# Patient Record
Sex: FEMALE | Race: WHITE
[De-identification: names, ages, dates, MRNs, and addresses within clinical notes are randomized per-mention and may not be internally consistent; named-entity substitution may affect disease eponyms.]

---

## 2018-01-26 ENCOUNTER — HOSPITAL ENCOUNTER (OUTPATIENT)
Dept: HOSPITAL 94 - DIABETIC | Age: 63
Discharge: HOME | End: 2018-01-26
Attending: SURGERY
Payer: MEDICARE

## 2018-01-26 DIAGNOSIS — N18.9: ICD-10-CM

## 2018-01-26 DIAGNOSIS — I12.9: ICD-10-CM

## 2018-01-26 DIAGNOSIS — Z85.9: ICD-10-CM

## 2018-01-26 DIAGNOSIS — E66.01: Primary | ICD-10-CM

## 2018-01-26 DIAGNOSIS — E11.22: ICD-10-CM

## 2018-01-26 PROCEDURE — 97802 MEDICAL NUTRITION INDIV IN: CPT

## 2018-02-16 ENCOUNTER — HOSPITAL ENCOUNTER (OUTPATIENT)
Dept: HOSPITAL 94 - DIABETIC | Age: 63
Discharge: HOME | End: 2018-02-16
Attending: SURGERY
Payer: MEDICARE

## 2018-02-16 DIAGNOSIS — E11.9: ICD-10-CM

## 2018-02-16 DIAGNOSIS — E66.01: Primary | ICD-10-CM

## 2018-02-16 PROCEDURE — 97802 MEDICAL NUTRITION INDIV IN: CPT

## 2018-03-23 ENCOUNTER — HOSPITAL ENCOUNTER (OUTPATIENT)
Dept: HOSPITAL 94 - DIABETIC | Age: 63
Discharge: HOME | End: 2018-03-23
Attending: SURGERY
Payer: MEDICARE

## 2018-03-23 DIAGNOSIS — E66.01: Primary | ICD-10-CM

## 2018-03-23 DIAGNOSIS — E11.22: ICD-10-CM

## 2018-03-23 DIAGNOSIS — N18.9: ICD-10-CM

## 2018-03-23 DIAGNOSIS — I12.9: ICD-10-CM

## 2018-03-23 PROCEDURE — 97802 MEDICAL NUTRITION INDIV IN: CPT

## 2020-04-24 ENCOUNTER — HOSPITAL ENCOUNTER (EMERGENCY)
Dept: HOSPITAL 94 - ER | Age: 65
Discharge: HOME | End: 2020-04-24
Payer: MEDICARE

## 2020-04-24 VITALS — DIASTOLIC BLOOD PRESSURE: 93 MMHG | SYSTOLIC BLOOD PRESSURE: 159 MMHG

## 2020-04-24 VITALS — WEIGHT: 227.08 LBS | HEIGHT: 61 IN | BODY MASS INDEX: 42.87 KG/M2

## 2020-04-24 DIAGNOSIS — Z85.3: ICD-10-CM

## 2020-04-24 DIAGNOSIS — N18.4: ICD-10-CM

## 2020-04-24 DIAGNOSIS — E11.22: ICD-10-CM

## 2020-04-24 DIAGNOSIS — Z79.82: ICD-10-CM

## 2020-04-24 DIAGNOSIS — Z79.4: ICD-10-CM

## 2020-04-24 DIAGNOSIS — R00.0: ICD-10-CM

## 2020-04-24 DIAGNOSIS — L03.113: ICD-10-CM

## 2020-04-24 DIAGNOSIS — Z79.899: ICD-10-CM

## 2020-04-24 DIAGNOSIS — I89.0: Primary | ICD-10-CM

## 2020-04-24 DIAGNOSIS — E11.65: ICD-10-CM

## 2020-04-24 LAB
ALBUMIN SERPL BCP-MCNC: 3.3 G/DL (ref 3.4–5)
ALBUMIN/GLOB SERPL: 0.8 {RATIO} (ref 1.1–1.5)
ALP SERPL-CCNC: 152 IU/L (ref 46–116)
ALT SERPL W P-5'-P-CCNC: 21 U/L (ref 12–78)
ANION GAP SERPL CALCULATED.3IONS-SCNC: 11 MMOL/L (ref 8–16)
AST SERPL W P-5'-P-CCNC: 13 U/L (ref 10–37)
BASOPHILS # BLD AUTO: 0.1 X10'3 (ref 0–0.2)
BASOPHILS NFR BLD AUTO: 0.4 % (ref 0–1)
BILIRUB SERPL-MCNC: 0.2 MG/DL (ref 0.1–1)
BUN SERPL-MCNC: 35 MG/DL (ref 7–18)
BUN/CREAT SERPL: 12.7 (ref 6.6–38)
CALCIUM SERPL-MCNC: 9 MG/DL (ref 8.5–10.1)
CHLORIDE SERPL-SCNC: 110 MMOL/L (ref 99–107)
CO2 SERPL-SCNC: 20.6 MMOL/L (ref 24–32)
CREAT SERPL-MCNC: 2.75 MG/DL (ref 0.4–0.9)
EOSINOPHIL # BLD AUTO: 0.2 X10'3 (ref 0–0.9)
EOSINOPHIL NFR BLD AUTO: 1.1 % (ref 0–6)
ERYTHROCYTE [DISTWIDTH] IN BLOOD BY AUTOMATED COUNT: 14 % (ref 11.5–14.5)
GFR SERPL CREATININE-BSD FRML MDRD: 17 ML/MIN
GLUCOSE SERPL-MCNC: 254 MG/DL (ref 70–104)
HCT VFR BLD AUTO: 38.7 % (ref 35–45)
HGB BLD-MCNC: 12.5 G/DL (ref 12–16)
LYMPHOCYTES # BLD AUTO: 0.8 X10'3 (ref 1.1–4.8)
LYMPHOCYTES NFR BLD AUTO: 4.7 % (ref 21–51)
MCH RBC QN AUTO: 29.1 PG (ref 27–31)
MCHC RBC AUTO-ENTMCNC: 32.3 G/DL (ref 33–36.5)
MCV RBC AUTO: 90.3 FL (ref 78–98)
MONOCYTES # BLD AUTO: 0.6 X10'3 (ref 0–0.9)
MONOCYTES NFR BLD AUTO: 3.5 % (ref 2–12)
NEUTROPHILS # BLD AUTO: 14.9 X10'3 (ref 1.8–7.7)
NEUTROPHILS NFR BLD AUTO: 90.3 % (ref 42–75)
PLATELET # BLD AUTO: 250 X10'3 (ref 140–440)
PMV BLD AUTO: 7.4 FL (ref 7.4–10.4)
POTASSIUM SERPL-SCNC: 4.8 MMOL/L (ref 3.5–5.1)
PROT SERPL-MCNC: 7.4 G/DL (ref 6.4–8.2)
RBC # BLD AUTO: 4.28 X10'6 (ref 4.2–5.6)
SODIUM SERPL-SCNC: 142 MMOL/L (ref 135–145)
WBC # BLD AUTO: 16.5 X10'3 (ref 4.5–11)

## 2020-04-24 PROCEDURE — 84145 PROCALCITONIN (PCT): CPT

## 2020-04-24 PROCEDURE — 99284 EMERGENCY DEPT VISIT MOD MDM: CPT

## 2020-04-24 PROCEDURE — 93005 ELECTROCARDIOGRAM TRACING: CPT

## 2020-04-24 PROCEDURE — 80053 COMPREHEN METABOLIC PANEL: CPT

## 2020-04-24 PROCEDURE — 85025 COMPLETE CBC W/AUTO DIFF WBC: CPT

## 2020-04-24 PROCEDURE — 36415 COLL VENOUS BLD VENIPUNCTURE: CPT

## 2020-08-25 ENCOUNTER — HOSPITAL ENCOUNTER (EMERGENCY)
Dept: HOSPITAL 94 - ER | Age: 65
Discharge: HOME | End: 2020-08-25
Payer: MEDICARE

## 2020-08-25 VITALS — WEIGHT: 219.58 LBS | HEIGHT: 61 IN | BODY MASS INDEX: 41.46 KG/M2

## 2020-08-25 VITALS — DIASTOLIC BLOOD PRESSURE: 79 MMHG | SYSTOLIC BLOOD PRESSURE: 118 MMHG

## 2020-08-25 DIAGNOSIS — I80.01: Primary | ICD-10-CM

## 2020-08-25 DIAGNOSIS — N18.9: ICD-10-CM

## 2020-08-25 DIAGNOSIS — Z79.4: ICD-10-CM

## 2020-08-25 DIAGNOSIS — M79.661: ICD-10-CM

## 2020-08-25 DIAGNOSIS — E11.22: ICD-10-CM

## 2020-08-25 DIAGNOSIS — M79.89: ICD-10-CM

## 2020-08-25 DIAGNOSIS — Z79.82: ICD-10-CM

## 2020-08-25 DIAGNOSIS — Z85.3: ICD-10-CM

## 2020-08-25 DIAGNOSIS — Z79.899: ICD-10-CM

## 2020-08-25 PROCEDURE — 99284 EMERGENCY DEPT VISIT MOD MDM: CPT

## 2020-08-25 PROCEDURE — 93971 EXTREMITY STUDY: CPT

## 2021-09-17 ENCOUNTER — HOSPITAL ENCOUNTER (EMERGENCY)
Dept: HOSPITAL 94 - ER | Age: 66
Discharge: HOME | End: 2021-09-17
Payer: MEDICARE

## 2021-09-17 VITALS — SYSTOLIC BLOOD PRESSURE: 107 MMHG | DIASTOLIC BLOOD PRESSURE: 46 MMHG

## 2021-09-17 VITALS — BODY MASS INDEX: 45.33 KG/M2 | HEIGHT: 59 IN | WEIGHT: 224.87 LBS

## 2021-09-17 DIAGNOSIS — Z79.899: ICD-10-CM

## 2021-09-17 DIAGNOSIS — Z79.01: ICD-10-CM

## 2021-09-17 DIAGNOSIS — Z79.2: ICD-10-CM

## 2021-09-17 DIAGNOSIS — Z85.9: ICD-10-CM

## 2021-09-17 DIAGNOSIS — L03.113: ICD-10-CM

## 2021-09-17 DIAGNOSIS — N18.4: ICD-10-CM

## 2021-09-17 DIAGNOSIS — Z79.4: ICD-10-CM

## 2021-09-17 DIAGNOSIS — E11.22: ICD-10-CM

## 2021-09-17 DIAGNOSIS — I82.433: Primary | ICD-10-CM

## 2021-09-17 LAB
ALBUMIN SERPL BCP-MCNC: 3.3 G/DL (ref 3.4–5)
ALBUMIN/GLOB SERPL: 0.8 {RATIO} (ref 1.1–1.5)
ALP SERPL-CCNC: 155 IU/L (ref 46–116)
ALT SERPL W P-5'-P-CCNC: 17 U/L (ref 12–78)
ANION GAP SERPL CALCULATED.3IONS-SCNC: 13 MMOL/L (ref 8–16)
AST SERPL W P-5'-P-CCNC: 10 U/L (ref 10–37)
BASOPHILS # BLD AUTO: 0.1 X10'3 (ref 0–0.2)
BASOPHILS NFR BLD AUTO: 0.4 % (ref 0–1)
BILIRUB SERPL-MCNC: 0.4 MG/DL (ref 0.1–1)
BUN SERPL-MCNC: 36 MG/DL (ref 7–18)
BUN/CREAT SERPL: 13.2 (ref 6.6–38)
CALCIUM SERPL-MCNC: 8.7 MG/DL (ref 8.5–10.1)
CHLORIDE SERPL-SCNC: 111 MMOL/L (ref 99–107)
CO2 SERPL-SCNC: 18.3 MMOL/L (ref 24–32)
CREAT SERPL-MCNC: 2.73 MG/DL (ref 0.4–0.9)
EOSINOPHIL # BLD AUTO: 0.1 X10'3 (ref 0–0.9)
EOSINOPHIL NFR BLD AUTO: 0.8 % (ref 0–6)
ERYTHROCYTE [DISTWIDTH] IN BLOOD BY AUTOMATED COUNT: 14.6 % (ref 11.5–14.5)
GFR SERPL CREATININE-BSD FRML MDRD: 17 ML/MIN
GLUCOSE SERPL-MCNC: 164 MG/DL (ref 70–104)
HCT VFR BLD AUTO: 38.9 % (ref 35–45)
HGB BLD-MCNC: 12.3 G/DL (ref 12–16)
LYMPHOCYTES # BLD AUTO: 0.8 X10'3 (ref 1.1–4.8)
LYMPHOCYTES NFR BLD AUTO: 4.8 % (ref 21–51)
MCH RBC QN AUTO: 27.5 PG (ref 27–31)
MCHC RBC AUTO-ENTMCNC: 31.6 G/DL (ref 33–36.5)
MCV RBC AUTO: 86.9 FL (ref 78–98)
MONOCYTES # BLD AUTO: 0.4 X10'3 (ref 0–0.9)
MONOCYTES NFR BLD AUTO: 2.2 % (ref 2–12)
NEUTROPHILS # BLD AUTO: 16.3 X10'3 (ref 1.8–7.7)
NEUTROPHILS NFR BLD AUTO: 91.8 % (ref 42–75)
PLATELET # BLD AUTO: 121 X10'3 (ref 140–440)
PMV BLD AUTO: 8.3 FL (ref 7.4–10.4)
POTASSIUM SERPL-SCNC: 5.1 MMOL/L (ref 3.5–5.1)
PROT SERPL-MCNC: 7.4 G/DL (ref 6.4–8.2)
RBC # BLD AUTO: 4.47 X10'6 (ref 4.2–5.6)
SODIUM SERPL-SCNC: 142 MMOL/L (ref 135–145)
WBC # BLD AUTO: 17.7 X10'3 (ref 4.5–11)

## 2021-09-17 PROCEDURE — 84145 PROCALCITONIN (PCT): CPT

## 2021-09-17 PROCEDURE — 85025 COMPLETE CBC W/AUTO DIFF WBC: CPT

## 2021-09-17 PROCEDURE — 96361 HYDRATE IV INFUSION ADD-ON: CPT

## 2021-09-17 PROCEDURE — 80053 COMPREHEN METABOLIC PANEL: CPT

## 2021-09-17 PROCEDURE — 99285 EMERGENCY DEPT VISIT HI MDM: CPT

## 2021-09-17 PROCEDURE — 83605 ASSAY OF LACTIC ACID: CPT

## 2021-09-17 PROCEDURE — 96360 HYDRATION IV INFUSION INIT: CPT

## 2021-09-17 PROCEDURE — 87040 BLOOD CULTURE FOR BACTERIA: CPT

## 2021-09-17 PROCEDURE — 93970 EXTREMITY STUDY: CPT

## 2021-09-17 PROCEDURE — 36415 COLL VENOUS BLD VENIPUNCTURE: CPT

## 2021-09-17 PROCEDURE — 96372 THER/PROPH/DIAG INJ SC/IM: CPT

## 2021-09-17 PROCEDURE — 93005 ELECTROCARDIOGRAM TRACING: CPT

## 2022-06-21 ENCOUNTER — HOSPITAL ENCOUNTER (INPATIENT)
Dept: HOSPITAL 94 - ER | Age: 67
LOS: 3 days | Discharge: HOME | DRG: 682 | End: 2022-06-24
Attending: FAMILY MEDICINE | Admitting: FAMILY MEDICINE
Payer: MEDICARE

## 2022-06-21 VITALS — HEIGHT: 59 IN | BODY MASS INDEX: 48.09 KG/M2 | WEIGHT: 238.54 LBS

## 2022-06-21 VITALS — SYSTOLIC BLOOD PRESSURE: 171 MMHG | DIASTOLIC BLOOD PRESSURE: 90 MMHG

## 2022-06-21 DIAGNOSIS — Z90.11: ICD-10-CM

## 2022-06-21 DIAGNOSIS — E87.6: ICD-10-CM

## 2022-06-21 DIAGNOSIS — E87.0: ICD-10-CM

## 2022-06-21 DIAGNOSIS — Z86.718: ICD-10-CM

## 2022-06-21 DIAGNOSIS — Z20.822: ICD-10-CM

## 2022-06-21 DIAGNOSIS — Z85.3: ICD-10-CM

## 2022-06-21 DIAGNOSIS — Z80.1: ICD-10-CM

## 2022-06-21 DIAGNOSIS — E11.22: ICD-10-CM

## 2022-06-21 DIAGNOSIS — D72.829: ICD-10-CM

## 2022-06-21 DIAGNOSIS — Z79.4: ICD-10-CM

## 2022-06-21 DIAGNOSIS — E78.5: ICD-10-CM

## 2022-06-21 DIAGNOSIS — I13.0: ICD-10-CM

## 2022-06-21 DIAGNOSIS — Z79.899: ICD-10-CM

## 2022-06-21 DIAGNOSIS — Z79.82: ICD-10-CM

## 2022-06-21 DIAGNOSIS — E87.2: ICD-10-CM

## 2022-06-21 DIAGNOSIS — I16.9: ICD-10-CM

## 2022-06-21 DIAGNOSIS — I50.9: ICD-10-CM

## 2022-06-21 DIAGNOSIS — Z82.49: ICD-10-CM

## 2022-06-21 DIAGNOSIS — E83.42: ICD-10-CM

## 2022-06-21 DIAGNOSIS — E66.01: ICD-10-CM

## 2022-06-21 DIAGNOSIS — F41.0: ICD-10-CM

## 2022-06-21 DIAGNOSIS — K29.70: ICD-10-CM

## 2022-06-21 DIAGNOSIS — Z79.01: ICD-10-CM

## 2022-06-21 DIAGNOSIS — G93.41: ICD-10-CM

## 2022-06-21 DIAGNOSIS — E43: ICD-10-CM

## 2022-06-21 DIAGNOSIS — N17.0: Primary | ICD-10-CM

## 2022-06-21 DIAGNOSIS — E87.5: ICD-10-CM

## 2022-06-21 DIAGNOSIS — N18.4: ICD-10-CM

## 2022-06-21 DIAGNOSIS — Z91.14: ICD-10-CM

## 2022-06-21 LAB
ALBUMIN SERPL BCP-MCNC: 2.9 G/DL (ref 3.4–5)
ALBUMIN/GLOB SERPL: 0.8 {RATIO} (ref 1.1–1.5)
ALP SERPL-CCNC: 96 IU/L (ref 46–116)
ALT SERPL W P-5'-P-CCNC: 15 U/L (ref 12–78)
ANION GAP SERPL CALCULATED.3IONS-SCNC: 16 MMOL/L (ref 8–16)
APTT PPP: 25 SECONDS (ref 22–32)
AST SERPL W P-5'-P-CCNC: 9 U/L (ref 10–37)
BASOPHILS # BLD AUTO: 0.1 X10'3 (ref 0–0.2)
BASOPHILS NFR BLD AUTO: 1 % (ref 0–1)
BILIRUB SERPL-MCNC: 0.3 MG/DL (ref 0.1–1)
BUN SERPL-MCNC: 51 MG/DL (ref 7–18)
BUN/CREAT SERPL: 9.7 (ref 6.6–38)
CALCIUM SERPL-MCNC: 7.8 MG/DL (ref 8.5–10.1)
CHLORIDE SERPL-SCNC: 111 MMOL/L (ref 99–107)
CO2 SERPL-SCNC: 17.3 MMOL/L (ref 24–32)
CREAT SERPL-MCNC: 5.26 MG/DL (ref 0.4–0.9)
EOSINOPHIL # BLD AUTO: 0.2 X10'3 (ref 0–0.9)
EOSINOPHIL NFR BLD AUTO: 1.9 % (ref 0–6)
ERYTHROCYTE [DISTWIDTH] IN BLOOD BY AUTOMATED COUNT: 14.2 % (ref 11.5–14.5)
GFR SERPL CREATININE-BSD FRML MDRD: 8 ML/MIN
GLUCOSE SERPL-MCNC: 152 MG/DL (ref 70–104)
HBA1C MFR BLD: 6.8 % (ref 4.5–6.2)
HCT VFR BLD AUTO: 36.9 % (ref 35–45)
HGB BLD-MCNC: 12.2 G/DL (ref 12–16)
LYMPHOCYTES # BLD AUTO: 1.4 X10'3 (ref 1.1–4.8)
LYMPHOCYTES NFR BLD AUTO: 14.8 % (ref 21–51)
MAGNESIUM SERPL-MCNC: 1.3 MG/DL (ref 1.5–2.4)
MCH RBC QN AUTO: 29.3 PG (ref 27–31)
MCHC RBC AUTO-ENTMCNC: 33.1 G/DL (ref 33–36.5)
MCV RBC AUTO: 88.6 FL (ref 78–98)
MONOCYTES # BLD AUTO: 0.5 X10'3 (ref 0–0.9)
MONOCYTES NFR BLD AUTO: 5.6 % (ref 2–12)
NEUTROPHILS # BLD AUTO: 7.3 X10'3 (ref 1.8–7.7)
NEUTROPHILS NFR BLD AUTO: 76.7 % (ref 42–75)
PHOSPHATE SERPL-MCNC: 5.9 MG/DL (ref 2.3–4.5)
PLATELET # BLD AUTO: 245 X10'3 (ref 140–440)
PMV BLD AUTO: 7.7 FL (ref 7.4–10.4)
POTASSIUM SERPL-SCNC: 5.1 MMOL/L (ref 3.5–5.1)
POTASSIUM SERPL-SCNC: 5.2 MMOL/L (ref 3.5–5.1)
PROT SERPL-MCNC: 6.5 G/DL (ref 6.4–8.2)
RBC # BLD AUTO: 4.17 X10'6 (ref 4.2–5.6)
SODIUM SERPL-SCNC: 144 MMOL/L (ref 135–145)
WBC # BLD AUTO: 9.5 X10'3 (ref 4.5–11)

## 2022-06-21 PROCEDURE — 99285 EMERGENCY DEPT VISIT HI MDM: CPT

## 2022-06-21 PROCEDURE — 87081 CULTURE SCREEN ONLY: CPT

## 2022-06-21 PROCEDURE — 85025 COMPLETE CBC W/AUTO DIFF WBC: CPT

## 2022-06-21 PROCEDURE — 71045 X-RAY EXAM CHEST 1 VIEW: CPT

## 2022-06-21 PROCEDURE — 93005 ELECTROCARDIOGRAM TRACING: CPT

## 2022-06-21 PROCEDURE — 76770 US EXAM ABDO BACK WALL COMP: CPT

## 2022-06-21 PROCEDURE — 83036 HEMOGLOBIN GLYCOSYLATED A1C: CPT

## 2022-06-21 PROCEDURE — 84133 ASSAY OF URINE POTASSIUM: CPT

## 2022-06-21 PROCEDURE — 70450 CT HEAD/BRAIN W/O DYE: CPT

## 2022-06-21 PROCEDURE — 82570 ASSAY OF URINE CREATININE: CPT

## 2022-06-21 PROCEDURE — 82948 REAGENT STRIP/BLOOD GLUCOSE: CPT

## 2022-06-21 PROCEDURE — 81001 URINALYSIS AUTO W/SCOPE: CPT

## 2022-06-21 PROCEDURE — 87635 SARS-COV-2 COVID-19 AMP PRB: CPT

## 2022-06-21 PROCEDURE — 80053 COMPREHEN METABOLIC PANEL: CPT

## 2022-06-21 PROCEDURE — 83605 ASSAY OF LACTIC ACID: CPT

## 2022-06-21 PROCEDURE — 84300 ASSAY OF URINE SODIUM: CPT

## 2022-06-21 PROCEDURE — 36415 COLL VENOUS BLD VENIPUNCTURE: CPT

## 2022-06-21 PROCEDURE — 84484 ASSAY OF TROPONIN QUANT: CPT

## 2022-06-21 PROCEDURE — 83880 ASSAY OF NATRIURETIC PEPTIDE: CPT

## 2022-06-21 PROCEDURE — 84100 ASSAY OF PHOSPHORUS: CPT

## 2022-06-21 PROCEDURE — 96374 THER/PROPH/DIAG INJ IV PUSH: CPT

## 2022-06-21 PROCEDURE — 83935 ASSAY OF URINE OSMOLALITY: CPT

## 2022-06-21 PROCEDURE — 84132 ASSAY OF SERUM POTASSIUM: CPT

## 2022-06-21 PROCEDURE — 85730 THROMBOPLASTIN TIME PARTIAL: CPT

## 2022-06-21 PROCEDURE — 87207 SMEAR SPECIAL STAIN: CPT

## 2022-06-21 PROCEDURE — 84156 ASSAY OF PROTEIN URINE: CPT

## 2022-06-21 PROCEDURE — 87040 BLOOD CULTURE FOR BACTERIA: CPT

## 2022-06-21 PROCEDURE — 84145 PROCALCITONIN (PCT): CPT

## 2022-06-21 PROCEDURE — 85610 PROTHROMBIN TIME: CPT

## 2022-06-21 PROCEDURE — 83735 ASSAY OF MAGNESIUM: CPT

## 2022-06-21 RX ADMIN — INSULIN GLARGINE SCH UNIT: 100 INJECTION, SOLUTION SUBCUTANEOUS at 22:08

## 2022-06-21 RX ADMIN — DEXTROSE SCH MLS/HR: 5 SOLUTION INTRAVENOUS at 19:31

## 2022-06-21 NOTE — NUR
PATIENT ADMITTED TO ROOM 3012C FROM ER FOR ACUTE RENAL FAILURE AND HYPERTENSION. PLACED 
COMFORTABLE IN BED. VITAL SIGNS TAKEN AND RECORDED.

## 2022-06-22 VITALS — DIASTOLIC BLOOD PRESSURE: 78 MMHG | SYSTOLIC BLOOD PRESSURE: 175 MMHG

## 2022-06-22 VITALS — SYSTOLIC BLOOD PRESSURE: 142 MMHG | DIASTOLIC BLOOD PRESSURE: 68 MMHG

## 2022-06-22 VITALS — DIASTOLIC BLOOD PRESSURE: 74 MMHG | SYSTOLIC BLOOD PRESSURE: 162 MMHG

## 2022-06-22 VITALS — DIASTOLIC BLOOD PRESSURE: 67 MMHG | SYSTOLIC BLOOD PRESSURE: 144 MMHG

## 2022-06-22 VITALS — SYSTOLIC BLOOD PRESSURE: 114 MMHG | DIASTOLIC BLOOD PRESSURE: 66 MMHG

## 2022-06-22 VITALS — SYSTOLIC BLOOD PRESSURE: 144 MMHG | DIASTOLIC BLOOD PRESSURE: 67 MMHG

## 2022-06-22 LAB
ALBUMIN SERPL BCP-MCNC: 2.7 G/DL (ref 3.4–5)
ALBUMIN/GLOB SERPL: 0.8 {RATIO} (ref 1.1–1.5)
ALP SERPL-CCNC: 86 IU/L (ref 46–116)
ALT SERPL W P-5'-P-CCNC: 12 U/L (ref 12–78)
ANION GAP SERPL CALCULATED.3IONS-SCNC: 14 MMOL/L (ref 8–16)
APTT PPP: 26 SECONDS (ref 22–32)
AST SERPL W P-5'-P-CCNC: 9 U/L (ref 10–37)
BASOPHILS # BLD AUTO: 0.1 X10'3 (ref 0–0.2)
BASOPHILS NFR BLD AUTO: 1.3 % (ref 0–1)
BILIRUB SERPL-MCNC: 0.3 MG/DL (ref 0.1–1)
BUN SERPL-MCNC: 51 MG/DL (ref 7–18)
BUN/CREAT SERPL: 9.6 (ref 6.6–38)
CALCIUM SERPL-MCNC: 7.8 MG/DL (ref 8.5–10.1)
CHLORIDE SERPL-SCNC: 113 MMOL/L (ref 99–107)
CO2 SERPL-SCNC: 21.9 MMOL/L (ref 24–32)
CREAT SERPL-MCNC: 5.3 MG/DL (ref 0.4–0.9)
EOSINOPHIL # BLD AUTO: 0 X10'3 (ref 0–0.9)
EOSINOPHIL NFR BLD AUTO: 0.4 % (ref 0–6)
ERYTHROCYTE [DISTWIDTH] IN BLOOD BY AUTOMATED COUNT: 14.6 % (ref 11.5–14.5)
GFR SERPL CREATININE-BSD FRML MDRD: 8 ML/MIN
GLUCOSE SERPL-MCNC: 99 MG/DL (ref 70–104)
HCT VFR BLD AUTO: 35.2 % (ref 35–45)
HGB BLD-MCNC: 11.5 G/DL (ref 12–16)
LYMPHOCYTES # BLD AUTO: 1.7 X10'3 (ref 1.1–4.8)
LYMPHOCYTES NFR BLD AUTO: 20 % (ref 21–51)
MAGNESIUM SERPL-MCNC: 1.5 MG/DL (ref 1.5–2.4)
MCH RBC QN AUTO: 29.4 PG (ref 27–31)
MCHC RBC AUTO-ENTMCNC: 32.7 G/DL (ref 33–36.5)
MCV RBC AUTO: 90 FL (ref 78–98)
MONOCYTES # BLD AUTO: 0.6 X10'3 (ref 0–0.9)
MONOCYTES NFR BLD AUTO: 7.1 % (ref 2–12)
NEUTROPHILS # BLD AUTO: 6 X10'3 (ref 1.8–7.7)
NEUTROPHILS NFR BLD AUTO: 71.2 % (ref 42–75)
PHOSPHATE SERPL-MCNC: 7 MG/DL (ref 2.3–4.5)
PLATELET # BLD AUTO: 245 X10'3 (ref 140–440)
PMV BLD AUTO: 7.9 FL (ref 7.4–10.4)
POTASSIUM SERPL-SCNC: 5.3 MMOL/L (ref 3.5–5.1)
PROT SERPL-MCNC: 6.2 G/DL (ref 6.4–8.2)
RBC # BLD AUTO: 3.91 X10'6 (ref 4.2–5.6)
SODIUM SERPL-SCNC: 149 MMOL/L (ref 135–145)
WBC # BLD AUTO: 8.5 X10'3 (ref 4.5–11)

## 2022-06-22 RX ADMIN — BUPROPION HYDROCHLORIDE SCH MG: 75 TABLET, FILM COATED ORAL at 07:45

## 2022-06-22 RX ADMIN — Medication SCH TAB: at 07:45

## 2022-06-22 RX ADMIN — INSULIN GLARGINE SCH UNIT: 100 INJECTION, SOLUTION SUBCUTANEOUS at 21:00

## 2022-06-22 RX ADMIN — DEXTROSE SCH MLS/HR: 5 SOLUTION INTRAVENOUS at 20:00

## 2022-06-22 RX ADMIN — DEXTROSE SCH MLS/HR: 5 SOLUTION INTRAVENOUS at 16:51

## 2022-06-22 RX ADMIN — DEXTROSE SCH MLS/HR: 5 SOLUTION INTRAVENOUS at 05:59

## 2022-06-22 NOTE — NUR
Patient in room PCU 3012. I have received report from GUSTAVO JASSO,   and had the opportunity 
to ask questions and assume patient care.

## 2022-06-22 NOTE — NUR
Problems reprioritized. Patient report given, questions answered & plan of care reviewed 
with GUSTAVO JASSO.

## 2022-06-22 NOTE — NUR
Problems reprioritized. Patient report given, questions answered & plan of care reviewed 
with COSME CHEN.

## 2022-06-22 NOTE — NUR
Patient in room PCU 3012. I have received report from  COSME CHEN  and had the opportunity to 
ask questions and assume patient care.

## 2022-06-22 NOTE — NUR
DM consult: Per EMR pt with T2DM, well controlled with A1c 6.8%. DM education not warranted 
at this time. Will continue to follow.

-------------------------------------------------------------------------------

Addendum: 06/22/22 at 0927 by Meg Lazar RD

-------------------------------------------------------------------------------

Amended: Links added.

## 2022-06-23 VITALS — SYSTOLIC BLOOD PRESSURE: 165 MMHG | DIASTOLIC BLOOD PRESSURE: 98 MMHG

## 2022-06-23 VITALS — SYSTOLIC BLOOD PRESSURE: 119 MMHG | DIASTOLIC BLOOD PRESSURE: 96 MMHG

## 2022-06-23 VITALS — SYSTOLIC BLOOD PRESSURE: 115 MMHG | DIASTOLIC BLOOD PRESSURE: 81 MMHG

## 2022-06-23 VITALS — DIASTOLIC BLOOD PRESSURE: 55 MMHG | SYSTOLIC BLOOD PRESSURE: 151 MMHG

## 2022-06-23 VITALS — SYSTOLIC BLOOD PRESSURE: 104 MMHG | DIASTOLIC BLOOD PRESSURE: 98 MMHG

## 2022-06-23 VITALS — DIASTOLIC BLOOD PRESSURE: 64 MMHG | SYSTOLIC BLOOD PRESSURE: 165 MMHG

## 2022-06-23 LAB
ALBUMIN SERPL BCP-MCNC: 2.7 G/DL (ref 3.4–5)
ALBUMIN/GLOB SERPL: 0.8 {RATIO} (ref 1.1–1.5)
ALP SERPL-CCNC: 81 IU/L (ref 46–116)
ALT SERPL W P-5'-P-CCNC: 13 U/L (ref 12–78)
ANION GAP SERPL CALCULATED.3IONS-SCNC: 11 MMOL/L (ref 8–16)
APTT PPP: 27 SECONDS (ref 22–32)
AST SERPL W P-5'-P-CCNC: 10 U/L (ref 10–37)
BACTERIA URNS QL MICRO: (no result) /HPF
BASOPHILS # BLD AUTO: 0 X10'3 (ref 0–0.2)
BASOPHILS NFR BLD AUTO: 0.1 % (ref 0–1)
BILIRUB SERPL-MCNC: 0.3 MG/DL (ref 0.1–1)
BUN SERPL-MCNC: 54 MG/DL (ref 7–18)
BUN/CREAT SERPL: 10.4 (ref 6.6–38)
CALCIUM SERPL-MCNC: 7.4 MG/DL (ref 8.5–10.1)
CHLORIDE SERPL-SCNC: 106 MMOL/L (ref 99–107)
CLARITY UR: CLEAR
CO2 SERPL-SCNC: 26.7 MMOL/L (ref 24–32)
COLOR UR: YELLOW
CREAT SERPL-MCNC: 5.2 MG/DL (ref 0.4–0.9)
CREAT UR-MCNC: 36 MG/DL
DEPRECATED SQUAMOUS URNS QL MICRO: (no result) /LPF
EOSINOPHIL # BLD AUTO: 0.1 X10'3 (ref 0–0.9)
EOSINOPHIL NFR BLD AUTO: 0.3 % (ref 0–6)
EOSINOPHIL URNS QL WRIGHT STN: (no result) /HPF
ERYTHROCYTE [DISTWIDTH] IN BLOOD BY AUTOMATED COUNT: 14.4 % (ref 11.5–14.5)
GFR SERPL CREATININE-BSD FRML MDRD: 8 ML/MIN
GLUCOSE SERPL-MCNC: 150 MG/DL (ref 70–104)
GLUCOSE UR STRIP-MCNC: 250 MG/DL
HCT VFR BLD AUTO: 35.1 % (ref 35–45)
HGB BLD-MCNC: 11.4 G/DL (ref 12–16)
HGB UR QL STRIP: (no result)
KETONES UR STRIP-MCNC: NEGATIVE MG/DL
LEUKOCYTE ESTERASE UR QL STRIP: NEGATIVE
LYMPHOCYTES # BLD AUTO: 0.7 X10'3 (ref 1.1–4.8)
LYMPHOCYTES NFR BLD AUTO: 5.2 % (ref 21–51)
MAGNESIUM SERPL-MCNC: 1.3 MG/DL (ref 1.5–2.4)
MCH RBC QN AUTO: 28.9 PG (ref 27–31)
MCHC RBC AUTO-ENTMCNC: 32.6 G/DL (ref 33–36.5)
MCV RBC AUTO: 88.6 FL (ref 78–98)
MONOCYTES # BLD AUTO: 0.6 X10'3 (ref 0–0.9)
MONOCYTES NFR BLD AUTO: 4 % (ref 2–12)
MUCOUS THREADS URNS QL MICRO: (no result) /LPF
NEUTROPHILS # BLD AUTO: 13.1 X10'3 (ref 1.8–7.7)
NEUTROPHILS NFR BLD AUTO: 90.4 % (ref 42–75)
NITRITE UR QL STRIP: NEGATIVE
OSMOLALITY UR: 344 MOSM/K (ref 50–1400)
PH UR STRIP: 8 [PH] (ref 4.8–8)
PHOSPHATE SERPL-MCNC: 5.1 MG/DL (ref 2.3–4.5)
PLATELET # BLD AUTO: 218 X10'3 (ref 140–440)
PMV BLD AUTO: 7.6 FL (ref 7.4–10.4)
POTASSIUM SERPL-SCNC: 4.4 MMOL/L (ref 3.5–5.1)
PROT SERPL-MCNC: 6.3 G/DL (ref 6.4–8.2)
PROT UR QL STRIP: >=300 MG/DL
PROT UR-MCNC: 646.8 MG/DL
RBC # BLD AUTO: 3.96 X10'6 (ref 4.2–5.6)
RBC #/AREA URNS HPF: (no result) /HPF (ref 0–2)
SODIUM ?TM SUB UR QN: 95 MEQ/L
SODIUM SERPL-SCNC: 144 MMOL/L (ref 135–145)
SP GR UR STRIP: 1.02 (ref 1–1.03)
URN COLLECT METHOD CLASS: (no result)
UROBILINOGEN UR STRIP-MCNC: 0.2 E.U/DL (ref 0.2–1)
WBC # BLD AUTO: 14.5 X10'3 (ref 4.5–11)
WBC #/AREA URNS HPF: (no result) /HPF (ref 0–4)

## 2022-06-23 RX ADMIN — DEXTROSE SCH MLS/HR: 5 SOLUTION INTRAVENOUS at 04:46

## 2022-06-23 RX ADMIN — SODIUM CHLORIDE SCH MLS/HR: 9 INJECTION INTRAMUSCULAR; INTRAVENOUS; SUBCUTANEOUS at 19:57

## 2022-06-23 RX ADMIN — INSULIN GLARGINE SCH UNIT: 100 INJECTION, SOLUTION SUBCUTANEOUS at 21:26

## 2022-06-23 RX ADMIN — BUPROPION HYDROCHLORIDE SCH MG: 75 TABLET, FILM COATED ORAL at 09:45

## 2022-06-23 RX ADMIN — SODIUM CHLORIDE SCH MLS/HR: 9 INJECTION INTRAMUSCULAR; INTRAVENOUS; SUBCUTANEOUS at 19:49

## 2022-06-23 RX ADMIN — Medication SCH TAB: at 09:45

## 2022-06-23 RX ADMIN — CEFTRIAXONE SCH MLS/HR: 1 INJECTION, SOLUTION INTRAVENOUS at 19:49

## 2022-06-23 NOTE — NUR
Problems reprioritized. Patient report given, questions answered & plan of care reviewed 
with WALDO RN.

## 2022-06-24 VITALS — SYSTOLIC BLOOD PRESSURE: 151 MMHG | DIASTOLIC BLOOD PRESSURE: 61 MMHG

## 2022-06-24 VITALS — SYSTOLIC BLOOD PRESSURE: 142 MMHG | DIASTOLIC BLOOD PRESSURE: 61 MMHG

## 2022-06-24 VITALS — SYSTOLIC BLOOD PRESSURE: 154 MMHG | DIASTOLIC BLOOD PRESSURE: 68 MMHG

## 2022-06-24 LAB
ALBUMIN SERPL BCP-MCNC: 2 G/DL (ref 3.4–5)
ALBUMIN/GLOB SERPL: 0.6 {RATIO} (ref 1.1–1.5)
ALP SERPL-CCNC: 67 IU/L (ref 46–116)
ALT SERPL W P-5'-P-CCNC: 10 U/L (ref 12–78)
ANION GAP SERPL CALCULATED.3IONS-SCNC: 10 MMOL/L (ref 8–16)
APTT PPP: 31 SECONDS (ref 22–32)
AST SERPL W P-5'-P-CCNC: 11 U/L (ref 10–37)
BASOPHILS # BLD AUTO: 0.1 X10'3 (ref 0–0.2)
BASOPHILS NFR BLD AUTO: 0.4 % (ref 0–1)
BILIRUB SERPL-MCNC: 0.2 MG/DL (ref 0.1–1)
BUN SERPL-MCNC: 51 MG/DL (ref 7–18)
BUN/CREAT SERPL: 9.7 (ref 6.6–38)
CALCIUM SERPL-MCNC: 6.9 MG/DL (ref 8.5–10.1)
CHLORIDE SERPL-SCNC: 109 MMOL/L (ref 99–107)
CO2 SERPL-SCNC: 26.8 MMOL/L (ref 24–32)
CREAT SERPL-MCNC: 5.28 MG/DL (ref 0.4–0.9)
EOSINOPHIL # BLD AUTO: 0.2 X10'3 (ref 0–0.9)
EOSINOPHIL NFR BLD AUTO: 1.7 % (ref 0–6)
ERYTHROCYTE [DISTWIDTH] IN BLOOD BY AUTOMATED COUNT: 14.6 % (ref 11.5–14.5)
GFR SERPL CREATININE-BSD FRML MDRD: 8 ML/MIN
GLUCOSE SERPL-MCNC: 108 MG/DL (ref 70–104)
HCT VFR BLD AUTO: 30.5 % (ref 35–45)
HGB BLD-MCNC: 9.9 G/DL (ref 12–16)
LYMPHOCYTES # BLD AUTO: 1.7 X10'3 (ref 1.1–4.8)
LYMPHOCYTES NFR BLD AUTO: 12.8 % (ref 21–51)
MAGNESIUM SERPL-MCNC: 1.8 MG/DL (ref 1.5–2.4)
MCH RBC QN AUTO: 29.2 PG (ref 27–31)
MCHC RBC AUTO-ENTMCNC: 32.4 G/DL (ref 33–36.5)
MCV RBC AUTO: 90.1 FL (ref 78–98)
MONOCYTES # BLD AUTO: 0.8 X10'3 (ref 0–0.9)
MONOCYTES NFR BLD AUTO: 6.3 % (ref 2–12)
NEUTROPHILS # BLD AUTO: 10.6 X10'3 (ref 1.8–7.7)
NEUTROPHILS NFR BLD AUTO: 78.8 % (ref 42–75)
PHOSPHATE SERPL-MCNC: 5.6 MG/DL (ref 2.3–4.5)
PLATELET # BLD AUTO: 190 X10'3 (ref 140–440)
PMV BLD AUTO: 8.2 FL (ref 7.4–10.4)
POTASSIUM SERPL-SCNC: 4.4 MMOL/L (ref 3.5–5.1)
PROT SERPL-MCNC: 5.5 G/DL (ref 6.4–8.2)
RBC # BLD AUTO: 3.38 X10'6 (ref 4.2–5.6)
SODIUM SERPL-SCNC: 146 MMOL/L (ref 135–145)
WBC # BLD AUTO: 13.4 X10'3 (ref 4.5–11)

## 2022-06-24 RX ADMIN — Medication SCH TAB: at 08:03

## 2022-06-24 RX ADMIN — INSULIN LISPRO SCH UNITS: 100 INJECTION, SOLUTION INTRAVENOUS; SUBCUTANEOUS at 08:11

## 2022-06-24 RX ADMIN — CEFTRIAXONE SCH MLS/HR: 1 INJECTION, SOLUTION INTRAVENOUS at 08:06

## 2022-06-24 RX ADMIN — BUPROPION HYDROCHLORIDE SCH MG: 75 TABLET, FILM COATED ORAL at 08:03

## 2022-06-24 RX ADMIN — INSULIN LISPRO SCH UNITS: 100 INJECTION, SOLUTION INTRAVENOUS; SUBCUTANEOUS at 13:31

## 2022-06-24 RX ADMIN — SODIUM CHLORIDE SCH MLS/HR: 9 INJECTION INTRAMUSCULAR; INTRAVENOUS; SUBCUTANEOUS at 03:47

## 2022-06-24 NOTE — NUR
Pt discharged per nursing. Discharge instructions and medications reviewed. No new 
prescriptions ordered. Pt instructed to follow up with Dr Verma in 2-3 weeks, and to 
return to ED if symptoms return or worsen. Pt states understanding and willingness to comply 
with discharge instructions. IV DC'd, cannula intact. Pt ride pending.

## 2022-06-24 NOTE — NUR
Patient in room PCU 3012. I have received report from  GUSTAVO Almaraz  and had the opportunity 
to ask questions and assume patient care.